# Patient Record
(demographics unavailable — no encounter records)

---

## 2025-05-14 NOTE — HISTORY OF PRESENT ILLNESS
[FreeTextEntry1] :  Patient here for periodic assessment and blood work. [de-identified] :  Patient here for periodic assessment and blood work. s/p endocrine consult due to hypothyroidism c/o periumbilical pulling sensation and left upper abdominal pain only when sleeping face down> currently undergoing GI w/u. c/o b/l hip pain x 7 years c/o low back pain rad to [facility]

## 2025-05-20 NOTE — HISTORY OF PRESENT ILLNESS
[FreeTextEntry1] : DEANDRA PATEL May 20 1956  Language: Malay ([place] [id number]) Date of First visit: 2024 Accompanied by:  Contact info: Referring Provider/PCP: Dr. Maher Fax: [phone]  CC/ Problem List:  microhematuria  CORA =============================================================================== FIRST VISIT / Summary: Very pleasant 68 year old F here for microhematuria. Does not drink much water. Reports sometimes dysuria, Former smoker quit 18 years ago. Smoked for 35 years and 2-3 ppd towards the end before quitting. Denies gross hematuria, kidney stones, chemotherapy, radiation or chemical exposure  They denied risk factors except as noted above including but not limited to: chemicals including dye, petroleum derivatives, chemotherapy, radiation, foreign objects (stents, catheters, stones, etc), or infections (TB, schistosomiasis, etc).  hematuria workup was negative end 2024 ------------------------------------------------------------------------------------------- INTERVAL VISITS: The patient's medications and allergies were reviewed and edited below. Dated 2025   She is here for discussion of stress incontinence with cough / sneeze. She uses one pad daily. Is at home and so can usually get to restroom in time (this is usually precipitated by lifting something). She has constant feeling of need to urinate  ===============================================================================  PMH: Hashimoto, HLD, HTN, depression Meds: atorvastatin, citalopram, irbesartan, vitamin D and levothyroxine All: kiwi, NKDA FHx: denies  malignancies SocHx: former smoker quit  smoked 35 years 2-3ppd towards the end, no Etoh, ,  (nsvdx2), retired  PSH: cholecystectomy 25 years ago ROS: Review of Systems is as per HPI unless otherwise denoted below  =============================================================================== DATA: LABS (SELECTED):---------------------------------------------------------------------------- 2024 UA RBC 7 2024 UA RBC 26, A1c 5.5, BUN/Cr 12/0.82  RADS:------------------------------------------------------------------------------------------------------------------- 2023 MRI Abdomen w/wo contrast ([facility])- status post cholecystectomy. Mild distention of the common bile duct with smooth tapering and no obstructing lesion seen. This finding may be related to sequelae of prior cholecystectomy or benign process such as sphincter of Ode dysfunction. Clinical correlation and follow up studies are suggested to assess stability. Renal cysts 24: [facility]: renal cysts, normal ureters and bladder.  PATHOLOGY/CYTOLOGY:-------------------------------------------------------------------------------------------   VOIDING STUDIES: ----------------------------------------------------------------------------------------------------   STONE STUDIES: (Analysis/LLSA)----------------------------------------------------------------------------------   PROCEDURES: -----------------------------------------------------------------------------------------------    =============================================================================== PHYSICAL EXAM:   FOCUSED: ----------------------------------------------------------------------------------------------------------------   ======================================================================================= DISCUSSION: ======================================================================================= ASSESSMENT and PLAN  1. microhematuria -UA/UCx  -CTU and cystoscopy normal  -f/u with PCP and as needed for any new issues   2. Stress > Urge incontinence - Leaking mostly with cough/sneeze or if she delays emptying - discussed estradiol and PT - does not want surgical discussion at this time until trial of the above  ======================================================================================= The total time personally spent preparing for this visit (reviewing test results, obtaining external history) and during the visit (ordering tests/medications, spent face to face with the patient / family and counseling them on the above), as well as after the visit (on clinical documentation and coordination with other care providers) was approximately 31 minutes in addition to any procedures.   Thank you for allowing me to assist in the care of your patient. Should you have any questions please do not hesitate to reach out to me.   Ras Wood MD [facility] Physician [facility]  [place] Office:  [facility], [address] [place] [address] T: [phone] F: [phone]  [place] Office:  [address] [place] [address], 1st [place] [address] T: [phone] F: [phone]

## 2025-06-04 NOTE — PLAN
[FreeTextEntry1] :   add HCT 12.5 daily and f/u in 2 weeks  advised ortho f/u regarding hip pain  f/u with endocrine regarding blood results  f/u with GI regarding abdominal pain.

## 2025-06-04 NOTE — HISTORY OF PRESENT ILLNESS
[FreeTextEntry1] :  patient following up regarding his BP/BP diary Encounter to discuss test results  [de-identified] :  patient following up regarding his BP/BP diary Encounter to discuss test results  Patient presents for medication management

## 2025-06-18 NOTE — HISTORY OF PRESENT ILLNESS
[FreeTextEntry1] :  Patient here for periodic assessment and blood work.  [de-identified] :  Patient here for periodic assessment and blood work.   Patient presents for medication management  pt still c/o back/hip pain. was unable to f/u with spine ortho/hip ortho due to insurance non-participation.

## 2025-06-18 NOTE — PLAN
[FreeTextEntry1] :  advised to get in contact with rhonda contreras MD participants with pts insurance.